# Patient Record
(demographics unavailable — no encounter records)

---

## 2024-12-10 NOTE — DISCUSSION/SUMMARY
[Normal Growth] : growth [Normal Development] : development [None] : No known medical problems [No Elimination Concerns] : elimination [No Feeding Concerns] : feeding [No Skin Concerns] : skin [Normal Sleep Pattern] : sleep [Family Routines] : family routines [Language Promotion and Communication] : language promotion and communication [Social Development] : social development [ Considerations] :  considerations [Safety] : safety [FreeTextEntry1] : Pt is a 30m old F presenting for WCC today. Pt is doing well, accompanied by Mom. Mom denies any recent ED, urgent care, hospital visits. Mom wants derm referral for hyperpigmentation spot on upper thigh that has not changed in appearance since last visit. Pt is eating well rounded meals 3 times a day, including proteins, veggies, fruits, grains, dairy. Pt has began transitioning from pull ups to toilet. Pt gets 10-12 hours of sleep per day. Help toddler to maintain consistent daily routines and sleep schedule. Mom has no concerns on development at this time. Speech has improved over last 6 months. Declines flu vaccination at this time.  Sibling with URI. Olga has been sneezing a little more than usual but exam is negative.  #HCM -continue encouraging toilet training -f/u with dentist annually  -discussed anticipatory guidance -RTC in 6 month for WCC or PRN  #hyperpigmentation -no change in apperance -f/u with derm referral  #anticipatory guidance Continue cow's milk. Continue table foods, 3 meals with 2-3 snacks per day. Incorporate fluorinated water daily in a sippy cup. Brush teeth twice a day with soft toothbrush. Recommend visit to dentist. When in car, keep child in rear-facing car seats until age 2, or until  the maximum height and weight for seat is reached. Put toddler to sleep in own bed. Help toddler to maintain consistent daily routines and sleep schedule. Toilet training discussed. Ensure home is safe. Use consistent, positive discipline. Read aloud to toddler. Limit screen time to no more than 2 hours per day.

## 2024-12-10 NOTE — HISTORY OF PRESENT ILLNESS
[Mother] : mother [whole ___ oz/d] : consumes [unfilled] oz of whole milk per day [Fruit] : fruit [Vegetables] : vegetables [Meat] : meat [Eggs] : eggs [___ stools per day] : [unfilled]  stools per day [___ voids per day] : [unfilled] voids per day [Normal] : Normal [In bed] : In bed [Sippy cup use] : Sippy cup use [Brushing teeth] : Brushing teeth [Toothpaste] : Primary Fluoride Source: Toothpaste [Playtime (60 min/d)] : Playtime 60 min a day [< 2 hrs of screen time] : Less than 2 hrs of screen time [No] : No cigarette smoke exposure [Water heater temperature set at <120 degrees F] : Water heater temperature set at <120 degrees F [Car seat in back seat] : Car seat in back seat [Carbon Monoxide Detectors] : Carbon monoxide detectors [Smoke Detectors] : Smoke detectors [Supervised play near cars and streets] : Supervised play near cars and streets [Up to date] : Up to date [NO] : No [Exposure to electronic nicotine delivery system] : No exposure to electronic nicotine delivery system [FreeTextEntry8] : started introducing toilet, pees in diaper twice rest od day in toilet

## 2024-12-10 NOTE — PHYSICAL EXAM

## 2024-12-10 NOTE — END OF VISIT
[FreeTextEntry3] : Case seen, discussed and examined with Yasmany MOSQUERA student DALE Quiñones Agree with plan Ovi Rodriguez MD